# Patient Record
Sex: MALE | Race: BLACK OR AFRICAN AMERICAN | NOT HISPANIC OR LATINO | Employment: FULL TIME | URBAN - METROPOLITAN AREA
[De-identification: names, ages, dates, MRNs, and addresses within clinical notes are randomized per-mention and may not be internally consistent; named-entity substitution may affect disease eponyms.]

---

## 2023-10-06 ENCOUNTER — TELEPHONE (OUTPATIENT)
Dept: PRIMARY CARE | Facility: HOSPITAL | Age: 58
End: 2023-10-06
Payer: COMMERCIAL

## 2023-11-15 PROBLEM — E78.5 HYPERLIPIDEMIA: Status: ACTIVE | Noted: 2023-11-15

## 2023-11-15 PROBLEM — G47.33 OBSTRUCTIVE SLEEP APNEA: Status: ACTIVE | Noted: 2023-11-15

## 2023-11-15 PROBLEM — T63.301A SPIDER BITE: Status: ACTIVE | Noted: 2023-11-15

## 2023-11-15 PROBLEM — H52.4 ASTIGMATISM WITH PRESBYOPIA: Status: ACTIVE | Noted: 2023-11-15

## 2023-11-15 PROBLEM — I10 HYPERTENSION: Status: ACTIVE | Noted: 2023-11-15

## 2023-11-15 PROBLEM — R73.03 PREDIABETES: Status: ACTIVE | Noted: 2023-11-15

## 2023-11-15 PROBLEM — H52.209 ASTIGMATISM WITH PRESBYOPIA: Status: ACTIVE | Noted: 2023-11-15

## 2023-11-15 RX ORDER — IBUPROFEN 400 MG/1
400 TABLET ORAL EVERY 8 HOURS PRN
COMMUNITY
Start: 2022-04-19 | End: 2023-11-16 | Stop reason: WASHOUT

## 2023-11-15 RX ORDER — AMOXICILLIN 500 MG/1
500 CAPSULE ORAL EVERY 8 HOURS SCHEDULED
COMMUNITY
Start: 2023-03-22

## 2023-11-15 RX ORDER — IBUPROFEN 800 MG/1
800 TABLET ORAL EVERY 6 HOURS PRN
COMMUNITY
Start: 2023-03-22 | End: 2023-11-16 | Stop reason: WASHOUT

## 2023-11-15 RX ORDER — AMLODIPINE BESYLATE 10 MG/1
10 TABLET ORAL DAILY
COMMUNITY
End: 2023-11-16 | Stop reason: SDUPTHER

## 2023-11-15 RX ORDER — GUAIFENESIN 600 MG/1
600 TABLET, EXTENDED RELEASE ORAL 2 TIMES DAILY
COMMUNITY
Start: 2020-03-17 | End: 2023-11-16 | Stop reason: WASHOUT

## 2023-11-16 ENCOUNTER — OFFICE VISIT (OUTPATIENT)
Dept: PRIMARY CARE | Facility: HOSPITAL | Age: 58
End: 2023-11-16
Payer: COMMERCIAL

## 2023-11-16 VITALS
TEMPERATURE: 97.5 F | WEIGHT: 240.5 LBS | DIASTOLIC BLOOD PRESSURE: 104 MMHG | BODY MASS INDEX: 35.62 KG/M2 | HEART RATE: 78 BPM | SYSTOLIC BLOOD PRESSURE: 152 MMHG | HEIGHT: 69 IN | OXYGEN SATURATION: 97 %

## 2023-11-16 DIAGNOSIS — I10 PRIMARY HYPERTENSION: ICD-10-CM

## 2023-11-16 DIAGNOSIS — E78.5 HYPERLIPIDEMIA, UNSPECIFIED HYPERLIPIDEMIA TYPE: ICD-10-CM

## 2023-11-16 DIAGNOSIS — Z00.00 HEALTHCARE MAINTENANCE: Primary | ICD-10-CM

## 2023-11-16 LAB
ALBUMIN SERPL BCP-MCNC: 4.7 G/DL (ref 3.4–5)
ALP SERPL-CCNC: 59 U/L (ref 33–120)
ALT SERPL W P-5'-P-CCNC: 16 U/L (ref 10–52)
ANION GAP SERPL CALC-SCNC: 14 MMOL/L (ref 10–20)
AST SERPL W P-5'-P-CCNC: 24 U/L (ref 9–39)
BASOPHILS # BLD AUTO: 0.01 X10*3/UL (ref 0–0.1)
BASOPHILS NFR BLD AUTO: 0.2 %
BILIRUB SERPL-MCNC: 0.7 MG/DL (ref 0–1.2)
BUN SERPL-MCNC: 20 MG/DL (ref 6–23)
CALCIUM SERPL-MCNC: 9.9 MG/DL (ref 8.6–10.6)
CHLORIDE SERPL-SCNC: 106 MMOL/L (ref 98–107)
CHOLEST SERPL-MCNC: 297 MG/DL (ref 0–199)
CHOLESTEROL/HDL RATIO: 4.9
CO2 SERPL-SCNC: 25 MMOL/L (ref 21–32)
CREAT SERPL-MCNC: 1.19 MG/DL (ref 0.5–1.3)
EOSINOPHIL # BLD AUTO: 0.06 X10*3/UL (ref 0–0.7)
EOSINOPHIL NFR BLD AUTO: 1.1 %
ERYTHROCYTE [DISTWIDTH] IN BLOOD BY AUTOMATED COUNT: 13.8 % (ref 11.5–14.5)
EST. AVERAGE GLUCOSE BLD GHB EST-MCNC: 123 MG/DL
GFR SERPL CREATININE-BSD FRML MDRD: 71 ML/MIN/1.73M*2
GLUCOSE SERPL-MCNC: 82 MG/DL (ref 74–99)
HBA1C MFR BLD: 5.9 %
HCT VFR BLD AUTO: 43.9 % (ref 41–52)
HDLC SERPL-MCNC: 61.1 MG/DL
HGB BLD-MCNC: 13.6 G/DL (ref 13.5–17.5)
IMM GRANULOCYTES # BLD AUTO: 0.01 X10*3/UL (ref 0–0.7)
IMM GRANULOCYTES NFR BLD AUTO: 0.2 % (ref 0–0.9)
LDLC SERPL CALC-MCNC: 205 MG/DL
LYMPHOCYTES # BLD AUTO: 1.94 X10*3/UL (ref 1.2–4.8)
LYMPHOCYTES NFR BLD AUTO: 35.1 %
MCH RBC QN AUTO: 26.5 PG (ref 26–34)
MCHC RBC AUTO-ENTMCNC: 31 G/DL (ref 32–36)
MCV RBC AUTO: 86 FL (ref 80–100)
MONOCYTES # BLD AUTO: 0.39 X10*3/UL (ref 0.1–1)
MONOCYTES NFR BLD AUTO: 7.1 %
NEUTROPHILS # BLD AUTO: 3.11 X10*3/UL (ref 1.2–7.7)
NEUTROPHILS NFR BLD AUTO: 56.3 %
NON HDL CHOLESTEROL: 236 MG/DL (ref 0–149)
NRBC BLD-RTO: 0 /100 WBCS (ref 0–0)
PLATELET # BLD AUTO: 208 X10*3/UL (ref 150–450)
POTASSIUM SERPL-SCNC: 4.4 MMOL/L (ref 3.5–5.3)
PROT SERPL-MCNC: 7.8 G/DL (ref 6.4–8.2)
RBC # BLD AUTO: 5.13 X10*6/UL (ref 4.5–5.9)
SODIUM SERPL-SCNC: 141 MMOL/L (ref 136–145)
TRIGL SERPL-MCNC: 157 MG/DL (ref 0–149)
TSH SERPL-ACNC: 2.29 MIU/L (ref 0.44–3.98)
VLDL: 31 MG/DL (ref 0–40)
WBC # BLD AUTO: 5.5 X10*3/UL (ref 4.4–11.3)

## 2023-11-16 PROCEDURE — 80053 COMPREHEN METABOLIC PANEL: CPT

## 2023-11-16 PROCEDURE — 99214 OFFICE O/P EST MOD 30 MIN: CPT

## 2023-11-16 PROCEDURE — 83036 HEMOGLOBIN GLYCOSYLATED A1C: CPT

## 2023-11-16 PROCEDURE — 36415 COLL VENOUS BLD VENIPUNCTURE: CPT | Mod: GC

## 2023-11-16 PROCEDURE — 3077F SYST BP >= 140 MM HG: CPT

## 2023-11-16 PROCEDURE — 36415 COLL VENOUS BLD VENIPUNCTURE: CPT

## 2023-11-16 PROCEDURE — 3080F DIAST BP >= 90 MM HG: CPT

## 2023-11-16 PROCEDURE — 80061 LIPID PANEL: CPT

## 2023-11-16 PROCEDURE — 85025 COMPLETE CBC W/AUTO DIFF WBC: CPT

## 2023-11-16 PROCEDURE — 3008F BODY MASS INDEX DOCD: CPT

## 2023-11-16 PROCEDURE — 84443 ASSAY THYROID STIM HORMONE: CPT

## 2023-11-16 PROCEDURE — 99214 OFFICE O/P EST MOD 30 MIN: CPT | Mod: GC

## 2023-11-16 PROCEDURE — 1036F TOBACCO NON-USER: CPT

## 2023-11-16 RX ORDER — AMLODIPINE BESYLATE 10 MG/1
10 TABLET ORAL DAILY
Qty: 90 TABLET | Refills: 3 | Status: SHIPPED | OUTPATIENT
Start: 2023-11-16

## 2023-11-16 RX ORDER — SIMVASTATIN 20 MG/1
20 TABLET, FILM COATED ORAL NIGHTLY
COMMUNITY
Start: 2009-09-22 | End: 2023-11-16 | Stop reason: SDUPTHER

## 2023-11-16 RX ORDER — SIMVASTATIN 20 MG/1
20 TABLET, FILM COATED ORAL NIGHTLY
Qty: 90 TABLET | Refills: 3 | Status: SHIPPED | OUTPATIENT
Start: 2023-11-16

## 2023-11-16 ASSESSMENT — ENCOUNTER SYMPTOMS
LOSS OF SENSATION IN FEET: 0
DEPRESSION: 0
OCCASIONAL FEELINGS OF UNSTEADINESS: 0

## 2023-11-16 ASSESSMENT — PATIENT HEALTH QUESTIONNAIRE - PHQ9
SUM OF ALL RESPONSES TO PHQ9 QUESTIONS 1 AND 2: 0
2. FEELING DOWN, DEPRESSED OR HOPELESS: NOT AT ALL
1. LITTLE INTEREST OR PLEASURE IN DOING THINGS: NOT AT ALL

## 2023-11-16 ASSESSMENT — COLUMBIA-SUICIDE SEVERITY RATING SCALE - C-SSRS
6. HAVE YOU EVER DONE ANYTHING, STARTED TO DO ANYTHING, OR PREPARED TO DO ANYTHING TO END YOUR LIFE?: NO
1. IN THE PAST MONTH, HAVE YOU WISHED YOU WERE DEAD OR WISHED YOU COULD GO TO SLEEP AND NOT WAKE UP?: NO
2. HAVE YOU ACTUALLY HAD ANY THOUGHTS OF KILLING YOURSELF?: NO

## 2023-11-16 NOTE — PROGRESS NOTES
Chief complaint:    HPI:  Danyel Rubio is a 58 y.o. male with PMH including hypertension, HLD, prediabetes, obesity, AILEEN not on CPAP presenting for follow up visit.    Patient reports not taking his BP medication for the past several months. Main reason being he doesn't want to be in any medications. He reports not being told he needed a statin for his ASCVD score. Patient report he has been trying to exerciser more often. We talked about the benefits of healthy living which will help him to reach his goals of being on minimal medication.   Patient had no other concerns today.    Medications:    Current Outpatient Medications:     amLODIPine (Norvasc) 10 mg tablet, Take 1 tablet (10 mg) by mouth once daily. as directed, Disp: 90 tablet, Rfl: 3    amoxicillin (Amoxil) 500 mg capsule, Take 1 capsule (500 mg) by mouth every 8 hours., Disp: , Rfl:     simvastatin (Zocor) 20 mg tablet, Take 1 tablet (20 mg) by mouth once daily at bedtime., Disp: 90 tablet, Rfl: 3    Allergies:  No Known Allergies    Review of systems:  Constitutional: negative for fevers, chills, weight loss, weight gain, change in appetite, fatigue, weakness.  HEENT: negative for headache, changes in vision or hearing, congestion, sore throat.  Respiratory: negative for SOB, cough, hemoptysis, wheezing  Cardiovascular: negative for chest pain, palpitations, orthopnea, PND  GI: negative for dysphagia, abdominal pain, nausea, vomiting, diarrhea, constipation, melena, hematochezia, BRBPR  : negative for frequency, urgency, dysuria, hematuria, incontinence  MSK: negative for myalgia, arthralgia, decreased joint ROM, LE swelling  Skin: negative for rash, wounds  Heme/lymph: negative for easy bruising, bleeding, epistaxis  Neuro: negative for LOC, numbness, tingling, tremor, vertigo, dizziness    Vitals:  Vitals:    11/16/23 1301   BP: (!) 152/104   Pulse: 78   Temp: 36.4 °C (97.5 °F)   SpO2: 97%       Physical exam:  Constitutional: Well-developed  male in no acute distress.  HEENT: Normocephalic, atraumatic. PERRL. EOMI. No cervical lymphadenopathy.  Respiratory: CTA bilaterally. No wheezes, rales, or rhonchi. Normal respiratory effort.  Cardiovascular: RRR. No murmurs, gallops, or rubs. No JVD. Radial pulses 2+.  Abdominal: Soft, nondistended, nontender to palpation. Bowel sounds present. No hepatosplenomegaly or masses. No CVA tenderness.  Neuro: CN II-XII intact. UE and LE strength 5/5 bilaterally and sensation intact. Normal FTN testing.  MSK: No LE edema bilaterally.  Skin: Warm, dry. No rashes or wounds.  Psych: Appropriate mood and affect.    Labs:  No results found for this or any previous visit (from the past 24 hour(s)).    Imaging:  No results found.    Assessment and plan:  Danyel Rubio is a 58 y.o. male ith PMH including hypertension, HLD, prediabetes, obesity, AILEEN not on CPAP presenting for follow up visit    #HTN  -Office /104   -States home BP is around 138/94  -Pt reported being medication non-adherant for the past several months.   -Was able to advised patient patient on the importance of taking his meds    Plan:  - Resume amlodipine 10 mg daily  -Lifestyle modification: consistency with his exercise and diet        #HLD  - 10-yr ASCVD score: 17.5 % (borderline risk), requires mod-high intensity,   - Patient declined statin in prior visits and talks with provider  >Was able to convince patient of the importance of starting a starting during this visit today  Plan:  - Start Simvastatin 20mg daily      #Obesity  -BMI 36.9  - encouraged moderate intensity exercise at least 3 times per week and healthy diet  -Pt prefers to trial diet and exercise rather than medication, will reassess weight at next visit  -Nutrition referral     #prediabetes  -Last Hgb A1C 6.2% (5/2025)  -Continues to decline Metformin     #AILEEN  - not on CPAP       #Health Maintenance  - Labs obtained today: CBC, CMP, Lipid, TSH, Hgb A1C  -Immunizations: Patient  declined Flu shot        Follow-up in 6 months.    Patient and plan discussed with attending physician, Dr. Aguilar.    Codi Lnagford MD  PGY-1 Internal Medicine  [facility]

## 2023-11-16 NOTE — PATIENT INSTRUCTIONS
Latosha Mr. Montaño, it was a pleasure seeing you in Clinic today.  Today we talked about your high blood pressure, the need for a cholesterol medication and prediabetes. These were all discussion we had started to have with you in your prior visit to the clinic. I am happy we were able to come up with a plan today on how to move forward.    -For your high blood pressure, please resume your amlodipine 10mg once a day  -As for your cholesterol medication - start the Simvastatin 20mg, once a day at bedtime     -As for your Prediabetes, we will continue to monitor your Hemoglobin A1C, and watch for now.    -Please continue to eat as health as you possibly can and exercise. It will pay dividends in getting you to your ultimate goal.     Today we got some blood from you to get some health maintenance labs,. The clinic will be contacting you about the results.     Please follow-up to Clinic in 6 months.    It was a pleasure meeting you today  Codi Langford MD  [facility]

## 2023-11-21 NOTE — PROGRESS NOTES
I saw and evaluated the patient. I personally obtained the key and critical portions of the history and physical exam or was physically present for key and critical portions performed by the resident/fellow. I reviewed the resident/fellow's documentation and discussed the patient with the resident/fellow. I agree with the resident/fellow's medical decision making as documented in the note.    Martin Aguilar MD MPH

## 2023-11-22 ENCOUNTER — TELEPHONE (OUTPATIENT)
Dept: PRIMARY CARE | Facility: HOSPITAL | Age: 58
End: 2023-11-22
Payer: COMMERCIAL

## 2023-11-22 NOTE — TELEPHONE ENCOUNTER
Called patient about his lab results. Informed him about his elevated lipid levels, and expressed the importance of him being consistent with taking the newly prescribed statin medication. He was agreeable to the recommendation.

## 2023-12-22 ENCOUNTER — TELEPHONE (OUTPATIENT)
Dept: PRIMARY CARE | Facility: HOSPITAL | Age: 58
End: 2023-12-22
Payer: COMMERCIAL

## 2023-12-22 NOTE — TELEPHONE ENCOUNTER
Called patient. He is having pain on the lateral side of his knee that he noticed while bowling. He thought this may be due to his statin that he started one month ago. He stopped taking the medication when the pain started because he thought it may be due to that. However, the pain has not improved since stopping the med. He denies fevers/chills and no redness or pururlence around the knee. Seems like knee pain is MSK injury related and encouraged him to make an appt with the clinic.

## 2024-01-10 ENCOUNTER — OFFICE VISIT (OUTPATIENT)
Dept: PRIMARY CARE | Facility: HOSPITAL | Age: 59
End: 2024-01-10
Payer: COMMERCIAL

## 2024-01-10 VITALS
SYSTOLIC BLOOD PRESSURE: 143 MMHG | TEMPERATURE: 97.3 F | HEIGHT: 69 IN | HEART RATE: 78 BPM | BODY MASS INDEX: 36.57 KG/M2 | WEIGHT: 246.9 LBS | OXYGEN SATURATION: 98 % | DIASTOLIC BLOOD PRESSURE: 91 MMHG

## 2024-01-10 DIAGNOSIS — M17.11 OSTEOARTHRITIS OF RIGHT KNEE, UNSPECIFIED OSTEOARTHRITIS TYPE: Primary | ICD-10-CM

## 2024-01-10 PROCEDURE — 3077F SYST BP >= 140 MM HG: CPT

## 2024-01-10 PROCEDURE — 3080F DIAST BP >= 90 MM HG: CPT

## 2024-01-10 PROCEDURE — 1036F TOBACCO NON-USER: CPT

## 2024-01-10 PROCEDURE — 99215 OFFICE O/P EST HI 40 MIN: CPT

## 2024-01-10 PROCEDURE — 3008F BODY MASS INDEX DOCD: CPT

## 2024-01-10 PROCEDURE — 99215 OFFICE O/P EST HI 40 MIN: CPT | Mod: GC

## 2024-01-10 ASSESSMENT — COLUMBIA-SUICIDE SEVERITY RATING SCALE - C-SSRS
6. HAVE YOU EVER DONE ANYTHING, STARTED TO DO ANYTHING, OR PREPARED TO DO ANYTHING TO END YOUR LIFE?: NO
2. HAVE YOU ACTUALLY HAD ANY THOUGHTS OF KILLING YOURSELF?: NO
1. IN THE PAST MONTH, HAVE YOU WISHED YOU WERE DEAD OR WISHED YOU COULD GO TO SLEEP AND NOT WAKE UP?: NO

## 2024-01-10 ASSESSMENT — ENCOUNTER SYMPTOMS
OCCASIONAL FEELINGS OF UNSTEADINESS: 0
LOSS OF SENSATION IN FEET: 0
DEPRESSION: 0

## 2024-01-10 ASSESSMENT — PAIN SCALES - GENERAL: PAINLEVEL: 6

## 2024-01-10 NOTE — PROGRESS NOTES
Chief complaint:Right knee joint swelling    HPI:  Danyel Rubio is a 58 y.o. male of hypertension, HLD, prediabetes, obesity, AILEEN not on CPAP presenting for right knee pain.  Patient notes that his right knee pain started about a month ago has gradually progressed sharp pain, located around the medial area.  Worsens on ambulating and relieved by rest mildly improved by over-the-counter analgesia.  He notes that he went to [facility] about a week ago and got an x-ray which showed No fractures or dislocations.  Questionable joint effusion in the suprapatellar pouch.  The joint spaces are maintained without evidence for significant degenerative or arthritic change. Upper and lower pole patellar enthesophytes as well as tibial tuberosity enthesophyte.  He notes that this he is worried since the pain has persisted for over 1 month.  He initially attributed the pain to statin his cholesterol medication however notes that the pain has persisted.  Patient denies resting joint pain, fever,chills , shortness of breath , headaches , back pain ,nausea claudication redness weakness numbness or tingling.    A 10 point review of system was negative except as otherwise stated above    Medications:    Current Outpatient Medications:     amLODIPine (Norvasc) 10 mg tablet, Take 1 tablet (10 mg) by mouth once daily. as directed, Disp: 90 tablet, Rfl: 3    amoxicillin (Amoxil) 500 mg capsule, Take 1 capsule (500 mg) by mouth every 8 hours., Disp: , Rfl:     simvastatin (Zocor) 20 mg tablet, Take 1 tablet (20 mg) by mouth once daily at bedtime., Disp: 90 tablet, Rfl: 3    Allergies:  No Known Allergies    Past medical history:  Past Medical History:   Diagnosis Date    Personal history of other diseases of the circulatory system     History of hypertension       Surgical history:  No past surgical history on file.    Family history:  No family history on file.    Social history:   reports that he has never smoked. He has  never used smokeless tobacco. He reports current alcohol use. He reports that he does not use drugs.    Health maintenance:  Health Maintenance   Topic Date Due    Yearly Adult Physical  Never done    Hepatitis B Vaccines (1 of 3 - 3-dose series) Never done    MMR Vaccines (1 of 1 - Standard series) Never done    Zoster Vaccines (1 of 2) Never done    COVID-19 Vaccine (3 - Pfizer series) 08/02/2021    Influenza Vaccine (1) 06/30/2024 (Originally 9/1/2023)    DTaP/Tdap/Td Vaccines (2 - Td or Tdap) 09/16/2024    Diabetes: Hemoglobin A1C  11/16/2024    Diabetes Screening  11/16/2026    Lipid Panel  11/16/2028    Colorectal Cancer Screening  03/30/2032    HIV Screening  Completed    Hepatitis C Screening  Completed    HIB Vaccines  Aged Out    IPV Vaccines  Aged Out    Hepatitis A Vaccines  Aged Out    Meningococcal Vaccine  Aged Out    Rotavirus Vaccines  Aged Out    HPV Vaccines  Aged Out    Pneumococcal Vaccine: Pediatrics (0 to 5 Years) and At-Risk Patients (6 to 64 Years)  Aged Out    Irritable Bowel Syndrome  Discontinued         Review of systems:  Review of Systems :A 10 point ROS was negative except otherwise stated above     Vitals:  Vitals:    01/10/24 1541   BP: (!) 143/91   Pulse: 78   Temp: 36.3 °C (97.3 °F)   SpO2: 98%       Physical exam:  GEN: Appears comfortable, non-toxic, A&O x 3  HEENT: NC/AT, PERRLA, EOMI  RESP: Normal breath sounds, no wheezing/rales/rhonchi  CV: RRR, no m/r/g  ABD: Soft, non-tender, non-distended  MSK: Right knee medial tenderness on palpation, no redness or visible swelling, soft on palpation, crepitus noted on extension. Normal tone, power, reflexes. Normal gait  SKIN: No rashes/lesions present  NEURO: CN II-XII grossly intact, no focal sensory or motor deficits. A&O x 3  PSYCH: Appropriate mood and affect     Labs:  Lab Results   Component Value Date    WBC 5.5 11/16/2023    HGB 13.6 11/16/2023    HCT 43.9 11/16/2023    MCV 86 11/16/2023     11/16/2023       Lab Results  "  Component Value Date    GLUCOSE 82 11/16/2023    CALCIUM 9.9 11/16/2023     11/16/2023    K 4.4 11/16/2023    CO2 25 11/16/2023     11/16/2023    BUN 20 11/16/2023    CREATININE 1.19 11/16/2023       Lab Results   Component Value Date    HGBA1C 5.9 (H) 11/16/2023        Lab Results   Component Value Date    CHOL 297 (H) 11/16/2023    CHOL 287 (H) 05/16/2023    CHOL 262 (H) 03/15/2022     Lab Results   Component Value Date    HDL 61.1 11/16/2023    HDL 54.2 05/16/2023    HDL 44.1 03/15/2022     Lab Results   Component Value Date    LDLCALC 205 (H) 11/16/2023     Lab Results   Component Value Date    TRIG 157 (H) 11/16/2023    TRIG 318 (H) 05/16/2023    TRIG 464 (H) 03/15/2022     No components found for: \"CHOLHDL\"    Imaging:  XR KNEE GENERAL 4V AP BOTH/PA BOTH/LAT/MERC RIGHT    Result Date: 1/3/2024  * * *Final Report* * * DATE OF EXAM: Chung  3 2024  6:22PM   EUX   5203  -  XR KNEE 4V AP/PA BOTH+LAT/LEV RT  / ACCESSION #  [id number] PROCEDURE REASON: Acute pain of right knee      * * * * Physician Interpretation * * * * RESULT: PROCEDURE:  Right knee INDICATION:  Acute pain of right knee   .PT WITH PAIN IN RT KNEE   PAIN FOR PAST THREE WEEKS    DIFFICULTY WALKING      DENIES INJURY   PT SEEN AT [facility]RGENT [facility]ARE [facility] TODAY TECHNIQUE:  XR KNEE 4V AP/PA BOTH+LAT/LEV RT COMPARISON:  None FINDINGS: No fractures or dislocations are seen.  Questionable joint effusion in the suprapatellar pouch.  The joint spaces are maintained without evidence for significant degenerative or arthritic change.  Upper and lower pole patellar enthesophytes as well as tibial tuberosity enthesophyte.    IMPRESSION: No acute osseous abnormality.  Possible joint effusion Transcribed Using Voice Recognition Transcribe Date/Time: Chung  3 2024  6:30P Dictated by: LYNN RAMIREZ MD This examination was interpreted and the report reviewed and electronically signed by: LYNN RMAIREZ MD on Chung  3 2024  6:31PM  EST      Assessment and plan:  Mr" Danyel Rubio is a 58 y.o. male of hypertension, HLD, prediabetes, obesity, AILEEN not on CPAP presenting for right knee pain x 1month duration        Right Knee Pain  Right Knee Osteoarthritis with Possible Tendinitis .    X-ray which showed No fractures or dislocations.  Questionable joint effusion in the suprapatellar pouch.  The joint spaces are maintained without evidence for significant degenerative or arthritic change. Upper and lower pole patellar enthesophytes as well as tibial tuberosity enthesophyte.  Notes mild improvement with OTC pain meds  Pt counseled on the need for physical therapy to help manage pain and agreeable   Will refer patient to physical therapy to follow up in 3 months     #HTN  -Office /91  -Pt States that his blood pressure at home 115s to early 130s  -Pt reports medication adherence and notes that his blood pressure is only elevated in the hospital.  -He was counselled to keep a blood pressure chart at home to enable adequate control of his bp.  -Counseled on exercise as tolerated and dietary changes     Plan:  -Continue amlodipine 10 mg daily          #HLD  - 10-yr ASCVD score: 17.5 % (borderline risk), requires mod-high intensity,   - Patient declined statin this visit and attributed his right knee pain to have started after he started taking simvastatin 20mg daily, patient counseled on the need for adequate cholesterol management, he will review at next appointment when his knee pain is improved.     #Obesity  -BMI 36.46  - encouraged moderate intensity exercise at least 3 times per week and healthy diet  -Pt prefers to trial diet and exercise rather than medication, will reassess weight at next visit  -Nutrition referral     #prediabetes  -Last Hgb A1C 5.9% (12/2023)  -Continues to decline Metformin      #AILEEN  - not on CPAP        #Health Maintenance  -Immunizations: Patient declined Flu shot        Follow-up in 3 months     Patient and plan discussed with attending  physician Dr. Aguilar.    Deepa Comer MD  PGY-3 Internal Medicine  [facility]

## 2024-01-10 NOTE — PATIENT INSTRUCTIONS
Dear Mr. Montaño, it was a pleasure seeing you at the clinic today.  Here is a summary of things we discussed today at this visit.    We did address-year-old right knee joint swelling and pain which has been persistent for the past 1 month.  We also reviewed your x-ray which she did actively in clinic.  From the look of things it looks like it is something called osteoarthritis.  I would like to schedule you for a physical therapy referral.  I encourage you to get this done for at least 3 months with improvement in your symptoms.  Please give us a call back if you notice worsening of your symptoms.    We also discussed keeping a blood pressure checked at home since your blood pressure is elevated at the clinic, however you noted that your blood pressure is normal at home in the 115's to 120s.  This is to help us control your blood pressure adequately.

## 2024-01-11 NOTE — PROGRESS NOTES
I saw and evaluated the patient. I personally obtained the key and critical portions of the history and physical exam or was physically present for key and critical portions performed by the resident/fellow. I reviewed the resident/fellow's documentation and discussed the patient with the resident/fellow. I agree with the resident/fellow's medical decision making as documented in the note.    I had a long conversation with the patient regarding the natural course of OA and the importance of attending PT - at least a few times (he was concerned about cost of these visits). I also think in addition to the OA, he likely has an MCL strain given pain on palpation of the medial side of the knee. This would likely also explain the joint effusion. Advised that if this does not improve over time the next step is referral to ortho.     Martin Aguilar MD MPH

## 2024-05-16 ENCOUNTER — OFFICE VISIT (OUTPATIENT)
Dept: PRIMARY CARE | Facility: HOSPITAL | Age: 59
End: 2024-05-16
Payer: COMMERCIAL

## 2024-05-16 VITALS
TEMPERATURE: 98.3 F | WEIGHT: 241 LBS | HEIGHT: 69 IN | BODY MASS INDEX: 35.7 KG/M2 | OXYGEN SATURATION: 97 % | SYSTOLIC BLOOD PRESSURE: 140 MMHG | HEART RATE: 78 BPM | DIASTOLIC BLOOD PRESSURE: 90 MMHG

## 2024-05-16 DIAGNOSIS — Z00.00 HEALTHCARE MAINTENANCE: Primary | ICD-10-CM

## 2024-05-16 LAB
ALBUMIN SERPL BCP-MCNC: 4.4 G/DL (ref 3.4–5)
ALP SERPL-CCNC: 67 U/L (ref 33–120)
ALT SERPL W P-5'-P-CCNC: 19 U/L (ref 10–52)
ANION GAP SERPL CALC-SCNC: 15 MMOL/L (ref 10–20)
AST SERPL W P-5'-P-CCNC: 22 U/L (ref 9–39)
BILIRUB SERPL-MCNC: 0.3 MG/DL (ref 0–1.2)
BUN SERPL-MCNC: 8 MG/DL (ref 6–23)
CALCIUM SERPL-MCNC: 9.5 MG/DL (ref 8.6–10.6)
CHLORIDE SERPL-SCNC: 103 MMOL/L (ref 98–107)
CHOLEST SERPL-MCNC: 279 MG/DL (ref 0–199)
CHOLESTEROL/HDL RATIO: 5.4
CO2 SERPL-SCNC: 24 MMOL/L (ref 21–32)
CREAT SERPL-MCNC: 1.18 MG/DL (ref 0.5–1.3)
EGFRCR SERPLBLD CKD-EPI 2021: 72 ML/MIN/1.73M*2
ERYTHROCYTE [DISTWIDTH] IN BLOOD BY AUTOMATED COUNT: 13.2 % (ref 11.5–14.5)
EST. AVERAGE GLUCOSE BLD GHB EST-MCNC: 143 MG/DL
GLUCOSE SERPL-MCNC: 113 MG/DL (ref 74–99)
HBA1C MFR BLD: 6.6 %
HCT VFR BLD AUTO: 44.4 % (ref 41–52)
HDLC SERPL-MCNC: 52.1 MG/DL
HGB BLD-MCNC: 14.4 G/DL (ref 13.5–17.5)
LDLC SERPL CALC-MCNC: 157 MG/DL
MCH RBC QN AUTO: 28.1 PG (ref 26–34)
MCHC RBC AUTO-ENTMCNC: 32.4 G/DL (ref 32–36)
MCV RBC AUTO: 87 FL (ref 80–100)
NON HDL CHOLESTEROL: 227 MG/DL (ref 0–149)
NRBC BLD-RTO: 0 /100 WBCS (ref 0–0)
PLATELET # BLD AUTO: 256 X10*3/UL (ref 150–450)
POTASSIUM SERPL-SCNC: 4 MMOL/L (ref 3.5–5.3)
PROT SERPL-MCNC: 7.6 G/DL (ref 6.4–8.2)
RBC # BLD AUTO: 5.12 X10*6/UL (ref 4.5–5.9)
SODIUM SERPL-SCNC: 138 MMOL/L (ref 136–145)
TRIGL SERPL-MCNC: 351 MG/DL (ref 0–149)
VLDL: 70 MG/DL (ref 0–40)
WBC # BLD AUTO: 5.6 X10*3/UL (ref 4.4–11.3)

## 2024-05-16 PROCEDURE — 36415 COLL VENOUS BLD VENIPUNCTURE: CPT

## 2024-05-16 PROCEDURE — 80061 LIPID PANEL: CPT

## 2024-05-16 PROCEDURE — 99214 OFFICE O/P EST MOD 30 MIN: CPT

## 2024-05-16 PROCEDURE — 80053 COMPREHEN METABOLIC PANEL: CPT

## 2024-05-16 PROCEDURE — 1036F TOBACCO NON-USER: CPT

## 2024-05-16 PROCEDURE — 85027 COMPLETE CBC AUTOMATED: CPT

## 2024-05-16 PROCEDURE — 82043 UR ALBUMIN QUANTITATIVE: CPT

## 2024-05-16 PROCEDURE — 99214 OFFICE O/P EST MOD 30 MIN: CPT | Mod: GC

## 2024-05-16 PROCEDURE — 3080F DIAST BP >= 90 MM HG: CPT

## 2024-05-16 PROCEDURE — 83036 HEMOGLOBIN GLYCOSYLATED A1C: CPT

## 2024-05-16 PROCEDURE — 3077F SYST BP >= 140 MM HG: CPT

## 2024-05-16 ASSESSMENT — ENCOUNTER SYMPTOMS
LOSS OF SENSATION IN FEET: 0
OCCASIONAL FEELINGS OF UNSTEADINESS: 0
DEPRESSION: 0

## 2024-05-16 ASSESSMENT — PATIENT HEALTH QUESTIONNAIRE - PHQ9
SUM OF ALL RESPONSES TO PHQ9 QUESTIONS 1 AND 2: 0
1. LITTLE INTEREST OR PLEASURE IN DOING THINGS: NOT AT ALL
2. FEELING DOWN, DEPRESSED OR HOPELESS: NOT AT ALL

## 2024-05-16 ASSESSMENT — COLUMBIA-SUICIDE SEVERITY RATING SCALE - C-SSRS
2. HAVE YOU ACTUALLY HAD ANY THOUGHTS OF KILLING YOURSELF?: NO
1. IN THE PAST MONTH, HAVE YOU WISHED YOU WERE DEAD OR WISHED YOU COULD GO TO SLEEP AND NOT WAKE UP?: NO
6. HAVE YOU EVER DONE ANYTHING, STARTED TO DO ANYTHING, OR PREPARED TO DO ANYTHING TO END YOUR LIFE?: NO

## 2024-05-16 ASSESSMENT — PAIN SCALES - GENERAL: PAINLEVEL: 0-NO PAIN

## 2024-05-16 NOTE — PATIENT INSTRUCTIONS
Latosha Mr. Rubio,    It was a pleasure meeting you in clinic today. Today was a follow-up visit in which we talked about your blood pressure management, as well as you not taking your statin medication.   if you continue to not take a statin medication, it is important that you should continue your lifestyle changes such as healthy eating and exercise routines.  I would recommend moderate intensity exercises such as your bike riding for 30 minutes a day, 3 times a week.  I would also recommend eating a Mediterranean style diet with high protein such as fish and lean meats, vegetables, whole grain carbs.    It was nice meeting you in clinic today.    Codi Langford  Internal Medicine  [facility]

## 2024-05-16 NOTE — PROGRESS NOTES
Chief complaint:Right knee joint swelling    HPI:  Danyel Rubio is a 58 y.o. male of hypertension, HLD, prediabetes, obesity, AILEEN not on CPAP presenting for FUV    Patient reports he never too the atorvastatin that was prescribed to him. He states he wasn't interested in taking it before it was prescribed and after taking it twice he stopped.     Exercise: 4-5 times a week; light intensity body weight load - leg lift, pushups, ab workups. Moderate intensity bike riding 3 times week.  2-3hrs a day     Diet: no longer eats greasy foods like he use too. Diet includes proteins and carbs, with some vegetable.     Patient reports knee pain that he had back in January is gone. Reports he never went t physical therapy.       A 10 point review of system was negative except as otherwise stated above    Medications:    Current Outpatient Medications:     amLODIPine (Norvasc) 10 mg tablet, Take 1 tablet (10 mg) by mouth once daily. as directed, Disp: 90 tablet, Rfl: 3    amoxicillin (Amoxil) 500 mg capsule, Take 1 capsule (500 mg) by mouth every 8 hours., Disp: , Rfl:     simvastatin (Zocor) 20 mg tablet, Take 1 tablet (20 mg) by mouth once daily at bedtime., Disp: 90 tablet, Rfl: 3    Allergies:  No Known Allergies    Past medical history:  Past Medical History:   Diagnosis Date    Personal history of other diseases of the circulatory system     History of hypertension       Surgical history:  No past surgical history on file.    Family history:  No family history on file.    Social history:   reports that he has never smoked. He has never used smokeless tobacco. He reports current alcohol use. He reports that he does not use drugs.    Health maintenance:  Health Maintenance   Topic Date Due    Influenza Vaccine (Season Ended) 09/01/2024    Colorectal Cancer Screening  03/30/2032         Review of systems:  Review of Systems :A 10 point ROS was negative except otherwise stated above     Vitals:  Vitals:    05/16/24 1304  "  BP: 140/90   Pulse: 78   Temp: 36.8 °C (98.3 °F)   SpO2: 97%       Physical exam:  GEN: Appears comfortable, non-toxic, A&O x 3  HEENT: NC/AT, PERRLA, EOMI  RESP: Normal breath sounds, no wheezing/rales/rhonchi  CV: RRR, no m/r/g  ABD: Soft, non-tender, non-distended  MSK: Right knee medial tenderness on palpation, no redness or visible swelling, soft on palpation, crepitus noted on extension. Normal tone, power, reflexes. Normal gait  SKIN: No rashes/lesions present  NEURO: CN II-XII grossly intact, no focal sensory or motor deficits. A&O x 3  PSYCH: Appropriate mood and affect     Labs:  Lab Results   Component Value Date    WBC 5.5 11/16/2023    HGB 13.6 11/16/2023    HCT 43.9 11/16/2023    MCV 86 11/16/2023     11/16/2023       Lab Results   Component Value Date    GLUCOSE 82 11/16/2023    CALCIUM 9.9 11/16/2023     11/16/2023    K 4.4 11/16/2023    CO2 25 11/16/2023     11/16/2023    BUN 20 11/16/2023    CREATININE 1.19 11/16/2023       Lab Results   Component Value Date    HGBA1C 5.9 (H) 11/16/2023        Lab Results   Component Value Date    CHOL 297 (H) 11/16/2023    CHOL 287 (H) 05/16/2023    CHOL 262 (H) 03/15/2022     Lab Results   Component Value Date    HDL 61.1 11/16/2023    HDL 54.2 05/16/2023    HDL 44.1 03/15/2022     Lab Results   Component Value Date    LDLCALC 205 (H) 11/16/2023     Lab Results   Component Value Date    TRIG 157 (H) 11/16/2023    TRIG 318 (H) 05/16/2023    TRIG 464 (H) 03/15/2022     No components found for: \"CHOLHDL\"    Imaging:  XR KNEE GENERAL 4V AP BOTH/PA BOTH/LAT/MERC RIGHT    Result Date: 1/3/2024  * * *Final Report* * * DATE OF EXAM: Chung  3 2024  6:22PM   EUX   5203  -  XR KNEE 4V AP/PA BOTH+LAT/LEV RT  / ACCESSION #  [id number] PROCEDURE REASON: Acute pain of right knee      * * * * Physician Interpretation * * * * RESULT: PROCEDURE:  Right knee INDICATION:  Acute pain of right knee   .PT WITH PAIN IN RT KNEE   PAIN FOR PAST THREE WEEKS    DIFFICULTY " WALKING      DENIES INJURY   PT SEEN AT URGENT [facility]ARE [facility] TODAY TECHNIQUE:  XR KNEE 4V AP/PA BOTH+LAT/LEV RT COMPARISON:  None FINDINGS: No fractures or dislocations are seen.  Questionable joint effusion in the suprapatellar pouch.  The joint spaces are maintained without evidence for significant degenerative or arthritic change.  Upper and lower pole patellar enthesophytes as well as tibial tuberosity enthesophyte.    IMPRESSION: No acute osseous abnormality.  Possible joint effusion Transcribed Using Voice Recognition Transcribe Date/Time: Chung  3 2024  6:30P Dictated by: LYNN RAMIREZ MD This examination was interpreted and the report reviewed and electronically signed by: YLNN RAMIREZ MD on Chung  3 2024  6:31PM  EST      Assessment and plan:  Mr Danyel Rubio is a 58 y.o. male of hypertension, HLD, prediabetes, obesity, AILEEN not on CPAP presenting for a follow up visit     #HTN  -Office /90 (today)  -Pt States that his blood pressure at home 115s to early 130s  -Pt reports medication adherence and notes that his blood pressure is only elevated in the hospital.  -He was counselled to keep a blood pressure chart at home to enable adequate control of his bp.  -Counseled on exercise as tolerated and dietary changes  Plan:  -Continue amlodipine 10 mg daily    Right Knee Pain (resolved)  Right Knee Osteoarthritis with Possible Tendinitis .    X-ray which showed No fractures or dislocations.  Questionable joint effusion in the suprapatellar pouch.  The joint spaces are maintained without evidence for significant degenerative or arthritic change. Upper and lower pole patellar enthesophytes as well as tibial tuberosity enthesophyte.  Notes mild improvement with OTC pain meds  Pt counseled on the need for physical therapy to help manage pain and agreeable   Will refer patient to physical therapy to follow up in 3 months           #HLD  - 10-yr ASCVD score: 17.5 % (borderline risk), requires mod-high  intensity,   - Patient declined statin this visit and attributed his right knee pain to have started after he started taking simvastatin 20mg daily, patient counseled on the need for adequate cholesterol management,   -Not taking Statin     #Obesity  -BMI 36.46  - encouraged moderate intensity exercise at least 3 times per week and healthy diet  -Pt prefers to trial diet and exercise rather than medication, will reassess weight at next visit  -Nutrition referral     #prediabetes  -Last Hgb A1C 5.9% (12/2023)  -Continues to decline Metformin      #AILEEN  - not on CPAP        #Health Maintenance  -Immunizations: Patient declined Flu shot        Follow-up in 1 year     Patient and plan discussed with attending physician Dr. Aguilar.    Codi Caceres MD  PGY-1 Internal Medicine  [facility]

## 2024-05-17 LAB
CREAT UR-MCNC: 173.4 MG/DL (ref 20–370)
MICROALBUMIN UR-MCNC: <7 MG/L
MICROALBUMIN/CREAT UR: NORMAL MG/G{CREAT}

## 2024-05-20 ENCOUNTER — TELEPHONE (OUTPATIENT)
Dept: INTERNAL MEDICINE | Facility: HOSPITAL | Age: 59
End: 2024-05-20
Payer: COMMERCIAL

## 2024-05-20 NOTE — TELEPHONE ENCOUNTER
Called to update patient about recent lab result but could not reach him on the phone. Will try again later.

## 2024-05-21 ENCOUNTER — APPOINTMENT (OUTPATIENT)
Dept: PRIMARY CARE | Facility: HOSPITAL | Age: 59
End: 2024-05-21
Payer: COMMERCIAL

## 2024-05-21 NOTE — PROGRESS NOTES
I saw and evaluated the patient. I personally obtained the key and critical portions of the history and physical exam or was physically present for key and critical portions performed by the resident/fellow. I reviewed the resident/fellow's documentation and discussed the patient with the resident/fellow. I agree with the resident/fellow's medical decision making as documented in the note.    Martin Aguilar MD MPH       Psychotherapy Provided: Individual Psychotherapy 45 minutes     Length of time in session: 45 minutes, follow up in 2 week    Encounter Diagnosis     ICD-10-CM    1  Generalized anxiety disorder  F41 1        Goals addressed in session: Goal 1     Pain:      none    0    Current suicide risk : Low     D-ct shared that he and wife brought there only son to college last week and that they are officially empty nesters  Ct reports that he has done some introspection and re processing some events between he and wife and he is being more forgiving and patient  Ct wife will see old friend at the end of the month who interfered negatively in there marriage  Ct was against this initially but has relented  Ct reports that there has been only one time in 2 weeks in which wife started an argument about the past   Ct reported that she was drinking but was not as bad  Ct wife may get a part time job  A- ct presented with moderate anxiety  Ct was verbal and engaged in session  Ct sleep is adequate  P- ct will attend session, take med's as instructed, and use techniques to manage moods  Behavioral Health Treatment Plan [facility]: Diagnosis and Treatment Plan explained to Kathrine Lawrence relates understanding diagnosis and is agreeable to Treatment Plan   Yes

## 2024-05-24 ENCOUNTER — TELEPHONE (OUTPATIENT)
Dept: INTERNAL MEDICINE | Facility: HOSPITAL | Age: 59
End: 2024-05-24
Payer: COMMERCIAL

## 2024-05-24 NOTE — TELEPHONE ENCOUNTER
Tried to contact patient about his abnormal   Lipid panel and new worsening Hgb A1c lab result but was unable to reach patient.    Patient has reported multiple times that he does not want to be on any medication except for his current HTN med. Despite me prescribing a statin for himin the past, he stopped taking it after 2 doses.  He has stated in our visits that he does not want to be on the statin and he is willing to take live with the potential risk wile trying lifestyle modification.    This new development of his worsening Hgb A1c is something he is unaware off.  Hopefully he will be amenable to taking a diabetic medication like Metformin.     Will have the office call the patient to discuss these updates to his labs.

## 2024-05-28 ENCOUNTER — TELEPHONE (OUTPATIENT)
Dept: INTERNAL MEDICINE | Facility: HOSPITAL | Age: 59
End: 2024-05-28
Payer: COMMERCIAL

## 2024-05-28 NOTE — TELEPHONE ENCOUNTER
I attempted to call the patient at his listed mobile/home number to discuss his new lab results. Per chart review, several attempts have been made to do so. He does not have a voicemail set up to leave a HIPAA compliant voicemail.     Per chart review, the patient has refused simvastatin and metformin in the past. His labs show an elevated lipid panel, and hemoglobin A1c of 6.6%, putting him now in the diabetes range (needs confirmation). His 10 year ASCVD risk score, not including diabetes is as below:     The 10-year ASCVD risk score (Yonatan WISEMAN, et al., 2019) is: 15.2%    Values used to calculate the score:      Age: 58 years      Sex: Male      Is Non- : Yes      Diabetic: No      Tobacco smoker: No      Systolic Blood Pressure: 140 mmHg      Is BP treated: Yes      HDL Cholesterol: 52.1 mg/dL      Total Cholesterol: 279 mg/dL     However, if the patient does now have diabetes mellitus, his ASCVD risk score would increase to 26.9%. Given this, the patient would benefit from anti-glycemic agents and use of a statin. He would also benefit from close outpatient follow-up.

## 2024-05-29 NOTE — PROGRESS NOTES
I discussed Mr. Rubio's lab results with him, especially his elevated cholesterol and new A1c of 6.6%. I mentioned that his numbers showed an elevated ASCVD risk of ~26% of heart and stroke in the next 10 years. Given the seriousness of his risk factors, I recommended initiation of a statin and glucose-lowering medication.     I first discussed starting a statin. He said he had been prescribed atorvastatin in the past, took it for two days, and then his muscles started hurting so he stopped. Explained that I could prescribe him a hydrophilic statin like rosuvastatin and pravastatin that would have a lower risk of myalgia.     I also discussed the various options available for control of glucose, given new elevation of Hgb A1c to 6.6%. Probably the best option for him would be an injectable GLP-1 agonist such as Victoza (covered by insurance), Trulicity, or Ozempic, as it would help with glycemic control and weight loss. We also could consider metformin.    Overall, patient was resistant to the idea of starting new medications even given ASCVD risk. He wanted to continue optimizing diet and exercise before he tried meds. He said that currently he did a lot of walking for exercise. I explained that in order to get the most benefit from exercise, he should go up to moderate intensity at least three times a week where he is sweating and short of breath enough that he can't speak sentences rather than words. He also aimed to cut back on butter and energy drinks. He agreed with me that he should come back sooner, in three to four months to see how well his interventions were working and to further discuss if medication is needed.

## 2025-01-29 ENCOUNTER — OFFICE VISIT (OUTPATIENT)
Dept: PRIMARY CARE | Facility: HOSPITAL | Age: 60
End: 2025-01-29
Payer: COMMERCIAL

## 2025-01-29 VITALS
OXYGEN SATURATION: 96 % | HEIGHT: 69 IN | SYSTOLIC BLOOD PRESSURE: 158 MMHG | HEART RATE: 85 BPM | TEMPERATURE: 97.3 F | DIASTOLIC BLOOD PRESSURE: 100 MMHG | WEIGHT: 247 LBS | BODY MASS INDEX: 36.58 KG/M2

## 2025-01-29 DIAGNOSIS — Z00.00 ROUTINE GENERAL MEDICAL EXAMINATION AT A HEALTH CARE FACILITY: Primary | ICD-10-CM

## 2025-01-29 DIAGNOSIS — I10 PRIMARY HYPERTENSION: ICD-10-CM

## 2025-01-29 DIAGNOSIS — E11.9 TYPE 2 DIABETES MELLITUS WITHOUT COMPLICATION, WITHOUT LONG-TERM CURRENT USE OF INSULIN (MULTI): ICD-10-CM

## 2025-01-29 RX ORDER — AMLODIPINE BESYLATE 10 MG/1
10 TABLET ORAL DAILY
Qty: 90 TABLET | Refills: 3 | Status: SHIPPED | OUTPATIENT
Start: 2025-01-29

## 2025-01-29 ASSESSMENT — PAIN SCALES - GENERAL: PAINLEVEL_OUTOF10: 0-NO PAIN

## 2025-01-29 ASSESSMENT — ENCOUNTER SYMPTOMS
FEVER: 0
WOUND: 0
ABDOMINAL DISTENTION: 0
CHILLS: 0
PALPITATIONS: 0
OCCASIONAL FEELINGS OF UNSTEADINESS: 0
DEPRESSION: 0
CONSTIPATION: 0
COUGH: 0
ABDOMINAL PAIN: 0
DIZZINESS: 0
LOSS OF SENSATION IN FEET: 1
NAUSEA: 0
SHORTNESS OF BREATH: 0
WEAKNESS: 0
DYSURIA: 0
DIARRHEA: 0
VOMITING: 0

## 2025-01-29 ASSESSMENT — PATIENT HEALTH QUESTIONNAIRE - PHQ9
2. FEELING DOWN, DEPRESSED OR HOPELESS: NOT AT ALL
SUM OF ALL RESPONSES TO PHQ9 QUESTIONS 1 AND 2: 0
1. LITTLE INTEREST OR PLEASURE IN DOING THINGS: NOT AT ALL

## 2025-01-29 NOTE — PATIENT INSTRUCTIONS
As discussed today, our plan is:     Labs - we ordered labs today and will call you with any abnormal results. If you have any questions or concerns prior to us calling feel free to call our office to have your questions addressed.   Medication changes: none  Consultations - you were referred to see the following specialists: nutrition You should receive a call from central scheduling in the next few days if you do not receive a call within 3-5 business days please call [phone] to schedule your appointment.   4.   If you smoke or use other tobacco products, take steps to quit. Call [phone] for more information or to set up an appointment with  [facility]. The [place] Tobacco Quit Line is a free resource for people who don’t have insurance, receive Medicaid, pregnant women, or members of the [place] [facility]. Call 9-218-QUIT-NOW or 1-611.160.8324.    Please come back to see us in: 6 months   ------  If you have any problems or questions, please contact the clinic at [phone] to leave a message. Our fax number is [phone]. If your issue cannot wait until the next business day, please go to urgent care or the emergency department.     I also strongly urge all of my patients to register for MyChart by going to: [email or web address]  (The  staff can also send you a text/email link to register when you check out).    No shows: It is understandable if you are unable to make it to a visit, but please cancel your appointment instead of not showing up. This helps to give other patients access to primary care and keeps wait times low.        Malik [facility]   [phone]

## 2025-01-29 NOTE — PROGRESS NOTES
Chief complaint:    HPI:  Danyel Rubio is a 59 y.o. male with pmh of hypertension, HLD, prediabetes, obesity, AILEEN not on CPAP presenting for follow up visit.    Since his last visit, patient has been feeling fine. He gets a little light-headed at night, which he attributes to drinking too much. He has not taken any medication in 3 months because his prescription ran out. He only takes amlodipine 10mg. He does not take the statin. He says it bothered his legs and so he doesn't want any of those types of medications anymore. He knows people who take metformin and does not want to be on that either. Prefers natural remedies.    He says he has been eating a poor diet with a lot of salt. He just started exercising again, he had taken a long break for a while. He drinks occasionally, usually 4-5 drinks each time. He has been gaining weight as a result of all of this.    He has a BP cuff at home and checks his BP regularly. He says his pressures used to be good when he was on his medication and recently they have been much higher.    Medications:  Current Outpatient Medications   Medication Instructions    amLODIPine (NORVASC) 10 mg, oral, Daily, AS DIRECTED    simvastatin (ZOCOR) 20 mg, oral, Nightly       Allergies:  No Known Allergies    Past medical history:  Past Medical History:   Diagnosis Date    Personal history of other diseases of the circulatory system     History of hypertension       Surgical history:  History reviewed. No pertinent surgical history.    Family history:  No family history on file.    Social history:   reports that he has never smoked. He has never used smokeless tobacco. He reports current alcohol use. He reports that he does not use drugs.    Health maintenance:  Health Maintenance   Topic Date Due    Yearly Adult Physical  Never done    MMR Vaccines (1 of 1 - Standard series) Never done    Hepatitis B Vaccines (1 of 3 - 19+ 3-dose series) Never done    Pneumococcal Vaccine (1 of 1 -  PCV) Never done    Zoster Vaccines (1 of 2) Never done    Influenza Vaccine (1) Never done    COVID-19 Vaccine (2 - 2024-25 season) 09/01/2024    DTaP/Tdap/Td Vaccines (2 - Td or Tdap) 09/16/2024    Diabetes: Hemoglobin A1C  05/16/2025    Diabetes Screening  05/16/2027    Lipid Panel  05/16/2029    Colorectal Cancer Screening  03/30/2032    HIV Screening  Completed    Hepatitis C Screening  Completed    HIB Vaccines  Aged Out    IPV Vaccines  Aged Out    Hepatitis A Vaccines  Aged Out    Meningococcal Vaccine  Aged Out    Rotavirus Vaccines  Aged Out    HPV Vaccines  Aged Out    Irritable Bowel Syndrome  Discontinued       Review of systems:  Review of Systems   Constitutional:  Negative for chills and fever.   Respiratory:  Negative for cough and shortness of breath.    Cardiovascular:  Negative for chest pain, palpitations and leg swelling.   Gastrointestinal:  Negative for abdominal distention, abdominal pain, constipation, diarrhea, nausea and vomiting.   Genitourinary:  Negative for dysuria.   Skin:  Negative for rash and wound.   Neurological:  Negative for dizziness, syncope and weakness.        Vitals:  Vitals:    01/29/25 1355   BP: (!) 158/100   Pulse: 85   Temp: 36.3 °C (97.3 °F)   SpO2: 96%       Physical exam:  Physical Exam  Constitutional:       General: He is not in acute distress.  HENT:      Head: Normocephalic and atraumatic.   Eyes:      Extraocular Movements: Extraocular movements intact.      Pupils: Pupils are equal, round, and reactive to light.   Cardiovascular:      Rate and Rhythm: Normal rate and regular rhythm.      Heart sounds: Normal heart sounds.   Pulmonary:      Effort: Pulmonary effort is normal. No respiratory distress.      Breath sounds: Normal breath sounds.   Abdominal:      General: Abdomen is flat. There is no distension.      Palpations: Abdomen is soft.      Tenderness: There is no abdominal tenderness.   Musculoskeletal:         General: Normal range of motion.       "Cervical back: Normal range of motion.   Skin:     General: Skin is warm and dry.   Neurological:      General: No focal deficit present.      Mental Status: He is alert and oriented to person, place, and time. Mental status is at baseline.   Psychiatric:         Mood and Affect: Mood normal.         Behavior: Behavior normal.         Labs:  Lab Results   Component Value Date    WBC 5.6 05/16/2024    HGB 14.4 05/16/2024    HCT 44.4 05/16/2024    MCV 87 05/16/2024     05/16/2024       Lab Results   Component Value Date    GLUCOSE 113 (H) 05/16/2024    CALCIUM 9.5 05/16/2024     05/16/2024    K 4.0 05/16/2024    CO2 24 05/16/2024     05/16/2024    BUN 8 05/16/2024    CREATININE 1.18 05/16/2024       Lab Results   Component Value Date    HGBA1C 6.6 (H) 05/16/2024        Lab Results   Component Value Date    CHOL 279 (H) 05/16/2024    CHOL 297 (H) 11/16/2023    CHOL 287 (H) 05/16/2023     Lab Results   Component Value Date    HDL 52.1 05/16/2024    HDL 61.1 11/16/2023    HDL 54.2 05/16/2023     Lab Results   Component Value Date    LDLCALC 157 (H) 05/16/2024    LDLCALC 205 (H) 11/16/2023     Lab Results   Component Value Date    TRIG 351 (H) 05/16/2024    TRIG 157 (H) 11/16/2023    TRIG 318 (H) 05/16/2023     No components found for: \"CHOLHDL\"    Imaging:  No results found.    Assessment and plan:  Danyel Rubio is a 59 y.o. male with pmh of hypertension, HLD, prediabetes, obesity, AILEEN not on CPAP presenting for follow up visit.    #HTN  #HLD  -BP uncontrolled, likely bc he ran out of home amlodipine 10mg, refilled today  -Not taking statin medication, refusing all cholesterol meds  -Last  (peaked at 205 in 11/2023)  -Does not want any other medication changes at this visit  -Encouraged to measure BP at home with cuff  -Repeat lipids, CMP, CBC  -Spoke at length about exercising more and improving diet  -Referral to nutritionist placed    #DM2  -Last A1c 6.6%  -Patient refusing metformin or " other diabetes meds, wants to try lifestyle changes/natural remedies  -Repeat A1c today    #AILEEN  -not on CPAP, does not want      HEALTH MAINTENANCE   Antibody Testing   HIV: negative in 2020  Syphilis: deferred to next visit   Hepatitis C: negative 2022  Vaccines  Influenza: refusing  Shingles: refusing  Tdap: refusing  COVID: refusing  Cancer screening   Colonoscopy: last completed Mar 30, 2022  Plan     Follow-up in 6 months.    Patient and plan discussed with attending physician Dr. Aguilar.    Rudy Seth MD

## 2025-02-06 LAB
ALBUMIN SERPL-MCNC: 4.7 G/DL (ref 3.6–5.1)
ALP SERPL-CCNC: 63 U/L (ref 35–144)
ALT SERPL-CCNC: 21 U/L (ref 9–46)
ANION GAP SERPL CALCULATED.4IONS-SCNC: 9 MMOL/L (CALC) (ref 7–17)
AST SERPL-CCNC: 28 U/L (ref 10–35)
BASOPHILS # BLD AUTO: 29 CELLS/UL (ref 0–200)
BASOPHILS NFR BLD AUTO: 0.5 %
BILIRUB SERPL-MCNC: 0.4 MG/DL (ref 0.2–1.2)
BUN SERPL-MCNC: 11 MG/DL (ref 7–25)
CALCIUM SERPL-MCNC: 9.6 MG/DL (ref 8.6–10.3)
CHLORIDE SERPL-SCNC: 103 MMOL/L (ref 98–110)
CHOLEST SERPL-MCNC: 213 MG/DL
CHOLEST/HDLC SERPL: 3.8 (CALC)
CO2 SERPL-SCNC: 26 MMOL/L (ref 20–32)
CREAT SERPL-MCNC: 1.17 MG/DL (ref 0.7–1.3)
EGFRCR SERPLBLD CKD-EPI 2021: 72 ML/MIN/1.73M2
EOSINOPHIL # BLD AUTO: 168 CELLS/UL (ref 15–500)
EOSINOPHIL NFR BLD AUTO: 2.9 %
ERYTHROCYTE [DISTWIDTH] IN BLOOD BY AUTOMATED COUNT: 13.8 % (ref 11–15)
EST. AVERAGE GLUCOSE BLD GHB EST-MCNC: 140 MG/DL
EST. AVERAGE GLUCOSE BLD GHB EST-SCNC: 7.7 MMOL/L
GLUCOSE SERPL-MCNC: 79 MG/DL (ref 65–99)
HBA1C MFR BLD: 6.5 % OF TOTAL HGB
HCT VFR BLD AUTO: 43.1 % (ref 38.5–50)
HDLC SERPL-MCNC: 56 MG/DL
HGB BLD-MCNC: 13.8 G/DL (ref 13.2–17.1)
LDLC SERPL CALC-MCNC: 130 MG/DL (CALC)
LYMPHOCYTES # BLD AUTO: 2134 CELLS/UL (ref 850–3900)
LYMPHOCYTES NFR BLD AUTO: 36.8 %
MCH RBC QN AUTO: 26.8 PG (ref 27–33)
MCHC RBC AUTO-ENTMCNC: 32 G/DL (ref 32–36)
MCV RBC AUTO: 83.7 FL (ref 80–100)
MONOCYTES # BLD AUTO: 528 CELLS/UL (ref 200–950)
MONOCYTES NFR BLD AUTO: 9.1 %
NEUTROPHILS # BLD AUTO: 2941 CELLS/UL (ref 1500–7800)
NEUTROPHILS NFR BLD AUTO: 50.7 %
NONHDLC SERPL-MCNC: 157 MG/DL (CALC)
PLATELET # BLD AUTO: 233 THOUSAND/UL (ref 140–400)
PMV BLD REES-ECKER: 10.9 FL (ref 7.5–12.5)
POTASSIUM SERPL-SCNC: 4.3 MMOL/L (ref 3.5–5.3)
PROT SERPL-MCNC: 8.1 G/DL (ref 6.1–8.1)
RBC # BLD AUTO: 5.15 MILLION/UL (ref 4.2–5.8)
SODIUM SERPL-SCNC: 138 MMOL/L (ref 135–146)
TRIGL SERPL-MCNC: 152 MG/DL
WBC # BLD AUTO: 5.8 THOUSAND/UL (ref 3.8–10.8)

## 2025-02-18 NOTE — PROGRESS NOTES
Called patient to discuss elevated lipid and A1c. He states he has started taking his rosuvastatin but does not want to start another medication at this time. Explained to him his current medications do not help control his blood sugar and we recommend starting a medication (discussed metformin, GLP1). Discussed risks associated with uncontrolled diabetes. Patient states he does not want another medication at this time and wants to try to control his blood sugar with diet and exercise. Counseled patient on healthy dietary and exercise habits. Patient agreeable and will be back in clinic in 6 months for another A1c check and to discuss starting a medication for DM.

## 2025-04-22 DIAGNOSIS — E78.5 HYPERLIPIDEMIA, UNSPECIFIED HYPERLIPIDEMIA TYPE: ICD-10-CM

## 2025-04-24 RX ORDER — SIMVASTATIN 20 MG/1
20 TABLET, FILM COATED ORAL NIGHTLY
Qty: 90 TABLET | Refills: 3 | Status: SHIPPED | OUTPATIENT
Start: 2025-04-24

## 2025-04-28 ENCOUNTER — PATIENT OUTREACH (OUTPATIENT)
Dept: CARE COORDINATION | Facility: CLINIC | Age: 60
End: 2025-04-28
Payer: COMMERCIAL

## 2025-04-28 NOTE — PROGRESS NOTES
Spoke to patient; poor connection as patient stated he was on a train. He requested written information sent to his email address: [email or web address]; patient does not have MYCHART set up.

## 2025-05-08 ENCOUNTER — PATIENT OUTREACH (OUTPATIENT)
Dept: CARE COORDINATION | Facility: CLINIC | Age: 60
End: 2025-05-08
Payer: COMMERCIAL

## 2025-05-23 ENCOUNTER — TELEPHONE (OUTPATIENT)
Dept: CARE COORDINATION | Facility: CLINIC | Age: 60
End: 2025-05-23
Payer: COMMERCIAL

## 2025-05-23 NOTE — PROGRESS NOTES
Date: 05/23/25    Dear Danyel Reina, I am the patient Navigator that works with the [facility] and was calling to ask you a few questions. Please give me a call back at your convenience.     Sincerely,    Veronica Eldridge, Patient Navigator    [phone]

## 2025-06-30 ASSESSMENT — ENCOUNTER SYMPTOMS
COUGH: 0
DIZZINESS: 0
WEAKNESS: 0
WOUND: 0
ABDOMINAL DISTENTION: 0
ABDOMINAL PAIN: 0
FEVER: 0
CHILLS: 0
NAUSEA: 0
SHORTNESS OF BREATH: 0
CONSTIPATION: 0
DYSURIA: 0
VOMITING: 0
DIARRHEA: 0
PALPITATIONS: 0

## 2025-07-01 ENCOUNTER — OFFICE VISIT (OUTPATIENT)
Dept: PRIMARY CARE | Facility: HOSPITAL | Age: 60
End: 2025-07-01
Payer: COMMERCIAL

## 2025-07-01 VITALS
BODY MASS INDEX: 36.88 KG/M2 | TEMPERATURE: 95.7 F | WEIGHT: 249 LBS | DIASTOLIC BLOOD PRESSURE: 83 MMHG | OXYGEN SATURATION: 98 % | SYSTOLIC BLOOD PRESSURE: 127 MMHG | HEIGHT: 69 IN | HEART RATE: 79 BPM

## 2025-07-01 DIAGNOSIS — E78.2 MIXED HYPERLIPIDEMIA: Primary | ICD-10-CM

## 2025-07-01 DIAGNOSIS — E11.00 TYPE 2 DIABETES MELLITUS WITH HYPEROSMOLARITY WITHOUT COMA, WITHOUT LONG-TERM CURRENT USE OF INSULIN (MULTI): ICD-10-CM

## 2025-07-01 PROCEDURE — 3008F BODY MASS INDEX DOCD: CPT

## 2025-07-01 PROCEDURE — 3079F DIAST BP 80-89 MM HG: CPT

## 2025-07-01 PROCEDURE — 3074F SYST BP LT 130 MM HG: CPT

## 2025-07-01 PROCEDURE — 99214 OFFICE O/P EST MOD 30 MIN: CPT

## 2025-07-01 PROCEDURE — 99214 OFFICE O/P EST MOD 30 MIN: CPT | Mod: GC

## 2025-07-01 PROCEDURE — 1036F TOBACCO NON-USER: CPT

## 2025-07-01 ASSESSMENT — ENCOUNTER SYMPTOMS
LOSS OF SENSATION IN FEET: 0
OCCASIONAL FEELINGS OF UNSTEADINESS: 0
DEPRESSION: 0

## 2025-07-01 ASSESSMENT — PAIN SCALES - GENERAL: PAINLEVEL_OUTOF10: 0-NO PAIN

## 2025-07-01 NOTE — PATIENT INSTRUCTIONS
It was great seeing you today. Attached to this document is a list of your current medications.    -Please get your lipid panel checked at a  lab at your earliest convenience and I will review the results    Please come back to see us in: 3 months, to follow up on your diabetes  ------  If you have any problems or questions, please contact the clinic at [phone] to leave a message. Our fax number is [phone]. If your issue cannot wait until the next business day, please go to urgent care or the emergency department.     I also strongly urge all of my patients to register for [employer] by going to: [email or web address]  (The  staff can also send you a text/email link to register when you check out).    No shows: It is understandable if you are unable to make it to a visit, but please cancel your appointment instead of not showing up. This helps to give other patients access to primary care and keeps wait times low.        Malik [facility]   [phone]

## 2025-07-01 NOTE — PROGRESS NOTES
Chief complaint: routine follow up    HPI:  Danyel Rubio is a 59 y.o. male with pmh of hypertension, HLD, diabetes (A1c 6.5), obesity, AILEEN not on CPAP presenting for follow up visit.    Last Visit 1/2025    Patient is presenting for routine 6 month follow-up.  No complaints today.  At his last visit he was complaining of some headache/migraines later in the evening but these have improved and no longer bothering him.  His cholesterol was discussed and recommended to take simvastatin daily which she has been doing his A1c of 6.6 was also discussed however he does not want to start any medications at this time.  On repeat A1c was 6.5 and he was still like just to manage this with diet and exercise. Denies chest pain, shortness of breath, headache, fever, chills, abdominal pain, constipation/diarrhea      Medications:  Current Outpatient Medications   Medication Instructions    amLODIPine (NORVASC) 10 mg, oral, Daily, AS DIRECTED    simvastatin (ZOCOR) 20 mg, oral, Nightly       Allergies:  No Active Allergies    Past medical history:  Past Medical History:   Diagnosis Date    Personal history of other diseases of the circulatory system     History of hypertension       Surgical history:  No past surgical history on file.    Family history:  No family history on file.    Social history:   reports that he has never smoked. He has never used smokeless tobacco. He reports current alcohol use. He reports that he does not use drugs.    Health maintenance:  Health Maintenance   Topic Date Due    Yearly Adult Physical  Never done    MMR Vaccines (1 of 1 - Standard series) Never done    Hepatitis B Vaccines (1 of 3 - 19+ 3-dose series) Never done    Pneumococcal Vaccine (1 of 2 - PCV) Never done    Zoster Vaccines (1 of 2) Never done    Diabetes: Retinopathy Screening  10/27/2016    COVID-19 Vaccine (2 - 2024-25 season) 09/01/2024    DTaP/Tdap/Td Vaccines (2 - Td or Tdap) 09/16/2024    Diabetes: Hemoglobin A1C   05/05/2025    Diabetes: Urine Protein Screening  05/16/2025    Influenza Vaccine (1) 09/01/2025    Lipid Panel  02/05/2026    Colorectal Cancer Screening  03/30/2032    HPV Vaccines (No Doses Required) Completed    HIV Screening  Completed    Hepatitis C Screening  Completed    HIB Vaccines  Aged Out    IPV Vaccines  Aged Out    Hepatitis A Vaccines  Aged Out    Meningococcal Vaccine  Aged Out    Rotavirus Vaccines  Aged Out    Irritable Bowel Syndrome  Discontinued       Review of systems:  Review of Systems   Constitutional:  Negative for chills and fever.   Respiratory:  Negative for cough and shortness of breath.    Cardiovascular:  Negative for chest pain, palpitations and leg swelling.   Gastrointestinal:  Negative for abdominal distention, abdominal pain, constipation, diarrhea, nausea and vomiting.   Genitourinary:  Negative for dysuria.   Skin:  Negative for rash and wound.   Neurological:  Negative for dizziness, syncope and weakness.        Vitals:  Vitals:    07/01/25 1026   BP: 127/83   Pulse: 79   Temp: 35.4 °C (95.7 °F)   SpO2: 98%         Physical exam:  Physical Exam  Constitutional:       General: He is not in acute distress.  HENT:      Head: Normocephalic and atraumatic.   Eyes:      Extraocular Movements: Extraocular movements intact.      Pupils: Pupils are equal, round, and reactive to light.   Cardiovascular:      Rate and Rhythm: Normal rate and regular rhythm.      Heart sounds: Normal heart sounds.   Pulmonary:      Effort: Pulmonary effort is normal. No respiratory distress.      Breath sounds: Normal breath sounds.   Abdominal:      General: Abdomen is flat. There is no distension.      Palpations: Abdomen is soft.      Tenderness: There is no abdominal tenderness.   Musculoskeletal:         General: Normal range of motion.      Cervical back: Normal range of motion.   Skin:     General: Skin is warm and dry.   Neurological:      General: No focal deficit present.      Mental Status: He  "is alert and oriented to person, place, and time. Mental status is at baseline.   Psychiatric:         Mood and Affect: Mood normal.         Behavior: Behavior normal.         Labs:  Lab Results   Component Value Date    WBC 5.8 02/05/2025    HGB 13.8 02/05/2025    HCT 43.1 02/05/2025    MCV 83.7 02/05/2025     02/05/2025       Lab Results   Component Value Date    GLUCOSE 79 02/05/2025    CALCIUM 9.6 02/05/2025     02/05/2025    K 4.3 02/05/2025    CO2 26 02/05/2025     02/05/2025    BUN 11 02/05/2025    CREATININE 1.17 02/05/2025       Lab Results   Component Value Date    HGBA1C 6.5 (H) 02/05/2025        Lab Results   Component Value Date    CHOL 213 (H) 02/05/2025    CHOL 279 (H) 05/16/2024    CHOL 297 (H) 11/16/2023     Lab Results   Component Value Date    HDL 56 02/05/2025    HDL 52.1 05/16/2024    HDL 61.1 11/16/2023     Lab Results   Component Value Date    LDLCALC 130 (H) 02/05/2025    LDLCALC 157 (H) 05/16/2024    LDLCALC 205 (H) 11/16/2023     Lab Results   Component Value Date    TRIG 152 (H) 02/05/2025    TRIG 351 (H) 05/16/2024    TRIG 157 (H) 11/16/2023     No components found for: \"CHOLHDL\"    Imaging:  No results found.    Assessment and plan:  Danyel Rubio is a 59 y.o. male with pmh of hypertension, HLD, diabetes (A1c 6.5), obesity, AILEEN not on CPAP presenting for 6 month follow up visit.    #HTN  #HLD  ::/83  ::Last  2/2025  -c/w amlodipine 10 daily  -c/w simvastatin 20, may need to change if LDL still not controlled but he is currently tolerating this med well (had complained of leg pains with statins in the past)  -LDL goal < 100  -Repeat lipid panel today (sent to lab b/c needed to return to work)  -Encouraging healthy diet and exercise      #DM2  -Last A1c 6.5% 2/2025  -Patient refusing metformin or other diabetes meds, wants to try lifestyle changes  -Annual eye exams, follows with optometrist  -RTC in 3 months to recheck A1c    #AILEEN  -not on CPAP, does " not want    #Labs  -sending to lab for lipid panel check      HEALTH MAINTENANCE   Antibody Testing -  not interested today  HIV: negative in 2020  Syphilis: deferred to next visit  Hepatitis C: negative 2022  Vaccines  Influenza: refusing  Shingles: refusing  Tdap: refusing  COVID: refusing  Cancer screening   Colonoscopy: last completed Mar 30, 2022  Plan     Follow-up in 3 months.    Patient and plan discussed with attending physician Dr. Josesito Pollock MD

## 2025-09-05 ENCOUNTER — TELEPHONE (OUTPATIENT)
Dept: CARE COORDINATION | Facility: CLINIC | Age: 60
End: 2025-09-05
Payer: COMMERCIAL